# Patient Record
Sex: FEMALE
[De-identification: names, ages, dates, MRNs, and addresses within clinical notes are randomized per-mention and may not be internally consistent; named-entity substitution may affect disease eponyms.]

---

## 2024-04-12 ENCOUNTER — NURSE TRIAGE (OUTPATIENT)
Dept: OTHER | Facility: CLINIC | Age: 29
End: 2024-04-12

## 2024-04-12 NOTE — TELEPHONE ENCOUNTER
Location of patient: [place]    Received call from Mehnaz at [facility]; Patient with Red Flag Complaint requesting to establish care with [facility].    Subjective: Caller states \"I've been having pains in my side area. It was only for about 2 months. I hadn't went to the doctor because it wasn't that serious up until a couple of days ago. A couple of weeks ago my pain had gotten worse. My nauseasness had gotten severe. I went to the ER and they said I have a mass in my ovaries by my tubes. They said I might have a cyst but they didn't want to rule out two types of cancers that run in my family. They gave me antibiotics and told me to make an appointment for an OBGYN to do an ultrasound and some tests. Today the vomiting has gotten worse. I am throwing up pure acid. I threw up a whole ton of acid, it was yellow. I'm also dizzy and having headaches. \"     Current Symptoms: nausea, vomiting, dizziness, headache, LLQ pain    Onset: 2 months ago; worsening    Associated Symptoms: reduced appetite, diarrhea, constipation    Pain Severity: 5/10; pressure, tightness, pinching; intermittent    Temperature: denies     What has been tried: Ondansetron, Tylenol, Ibuprofen    LMP:  1 month ago  Pregnant: No    Recommended disposition: Go to ED/UCC Now (Or to Office with PCP Approval)    Care advice provided, patient verbalizes understanding; denies any other questions or concerns; instructed to call back for any new or worsening symptoms.    Patient/caller agrees to proceed to nearest Emergency Department    Attention Provider:  Thank you for allowing me to participate in the care of your patient.  The patient was connected to triage in response to information provided to the [facility].  Please do not respond through this encounter as the response is not directed to a shared pool.      Reason for Disposition   Constant abdominal pain lasting > 2 hours    Protocols used: Vomiting-ADULT-[place]